# Patient Record
Sex: FEMALE | Race: WHITE | NOT HISPANIC OR LATINO | Employment: FULL TIME | URBAN - METROPOLITAN AREA
[De-identification: names, ages, dates, MRNs, and addresses within clinical notes are randomized per-mention and may not be internally consistent; named-entity substitution may affect disease eponyms.]

---

## 2023-07-07 ENCOUNTER — HOSPITAL ENCOUNTER (EMERGENCY)
Facility: CLINIC | Age: 28
Discharge: HOME | End: 2023-07-08
Attending: EMERGENCY MEDICINE | Admitting: EMERGENCY MEDICINE
Payer: COMMERCIAL

## 2023-07-07 VITALS
RESPIRATION RATE: 18 BRPM | BODY MASS INDEX: 23.28 KG/M2 | HEART RATE: 66 BPM | TEMPERATURE: 97.6 F | HEIGHT: 67 IN | DIASTOLIC BLOOD PRESSURE: 72 MMHG | SYSTOLIC BLOOD PRESSURE: 121 MMHG | WEIGHT: 148.3 LBS | OXYGEN SATURATION: 99 %

## 2023-07-07 DIAGNOSIS — S01.511A LACERATION OF INTRAORAL SURFACE OF LIP, INITIAL ENCOUNTER: ICD-10-CM

## 2023-07-07 PROCEDURE — 99282 EMERGENCY DEPT VISIT SF MDM: CPT

## 2023-07-08 NOTE — ED TRIAGE NOTES
Patient was at a [employer] Game and got hit with a baseball on left side of lower lip. There is a laceration on the inside of the left lower lip, mild bleeding. Denies injuries elsewhere, denies dental/bite issues caused by baseball injury. Mild swelling and bruising noted on left side of lower lip.      Triage Assessment       Row Name 07/07/23 4380       Triage Assessment (Adult)    Airway WDL WDL       Respiratory WDL    Respiratory WDL WDL       Skin Circulation/Temperature WDL    Skin Circulation/Temperature WDL WDL       Cardiac WDL    Cardiac WDL WDL       Peripheral/Neurovascular WDL    Peripheral Neurovascular WDL WDL       Cognitive/Neuro/Behavioral WDL    Cognitive/Neuro/Behavioral WDL WDL

## 2023-07-08 NOTE — ED PROVIDER NOTES
EMERGENCY DEPARTMENT ENCOUNTER      NAME: Jazlyn Wood  AGE: 27 year old female  YOB: 1995  MRN: [id number]  EVALUATION DATE & TIME: 7/7/2023 11:52 PM    PCP: Clinic, [facility]    ED PROVIDER: Evelina Olivares M.D.      Chief Complaint   Patient presents with     Lip Laceration         FINAL IMPRESSION:  1. Laceration of intraoral surface of lip, initial encounter        MEDICAL DECISION MAKING:    Pertinent Labs & Imaging studies reviewed. (See chart for details)  ED Course as of 07/08/23 0313   Sat Jul 08, 2023   0009 Afebrile.  Vital signs are unremarkable.  Patient presenting with laceration to her right lower lip.  She was at a [employer] game, got hit in the mouth with a foul ball.  Has a small laceration to the inside of her lip.  Bleeding is controlled.  No loose teeth, no tenderness with her jaw.  Able to open and close her jaw without issue.  No other facial pain.  No blood thinner medications.  No loss of consciousness.  No neck pain.    Exam for patient here with swollen bottom lip primarily on the right.  She has a small 1 cm laceration shallow to the inside of her lower lip on the right-hand side.  No through and through laceration.  No injury to the vermilion border.  No loose teeth.  No tenderness on palpation of the jaw.  No midline C-spine tenderness.    No need for any primary closure.  Patient was given instruction to use salt water rinses few times a day as well as to rinse her mouth out with water after she eats or drinks anything for the next week.  Will discharge home at this time.             Medical Decision Making    History:    Supplemental history from: Documented in chart, if applicable    External Record(s) reviewed: Documented in chart, if applicable.    Work Up:    Chart documentation includes differential considered and any EKGs or imaging independently interpreted by provider, where specified.    In additional to work up documented, I considered the  following work up: Documented in chart, if applicable.    External consultation:    Discussion of management with another provider: Documented in chart, if applicable    Complicating factors:    Care impacted by chronic illness: N/A    Care affected by social determinants of health: N/A    Disposition considerations: Discharge. No recommendations on prescription strength medication(s). See documentation for any additional details.          Critical care: 0 minutes excluding separately billable procedures.  Includes bedside management, time reviewing test results, review of records, discussing the case with staff, documenting the medical record and time spent with family members (or surrogate decision makers) discussing specific treatment issues.          ED COURSE:  12:10 AM Met with patient for initial interview and exam. Discussed initial plan for care for their stay in the emergency department. The importance of close follow up was discussed. We reviewed warning signs and symptoms, and I instructed Ms. Wood to return to the emergency department immediately if she develops any new or worsening symptoms. I provided additional verbal discharge instructions. Ms. Wood expressed understanding and agreement with this plan of care, her questions were answered, and she was discharged in stable condition.     MEDICATIONS GIVEN IN THE EMERGENCY:  Medications - No data to display    NEW PRESCRIPTIONS STARTED AT TODAY'S ER VISIT:  There are no discharge medications for this patient.         =================================================================    HPI    Patient information was obtained from: patient    Use of : N/A        Jazlyn Wood is a 27 year old female who presents to the ED for an evaluation of a lip laceraction    Earlier today the patient was at a [employer] game when she got hit in the mouth with a foul ball. The patient endorsed a small laceration to the the inside of her lip after the  "incident. The bleeding is currently controlled. She denies any loss of consciousness after the incident.     She denies any current blood thinning medications. The patient denies any loose teeth, tenderness with her jaw, facial pain, neck pain, or any other complaints.      REVIEW OF SYSTEMS   Review of Systems   Skin:        Positive for a laceration to the inside of her lip.       PAST MEDICAL HISTORY:  No past medical history on file.    PAST SURGICAL HISTORY:  No past surgical history on file.    CURRENT MEDICATIONS:    No current facility-administered medications for this encounter.  No current outpatient medications on file.    ALLERGIES:  No Known Allergies    FAMILY HISTORY:  No family history on file.    SOCIAL HISTORY:   Social History     Socioeconomic History     Marital status:        PHYSICAL EXAM:    Vitals: /72   Pulse 66   Temp 97.6  F (36.4  C) (Temporal)   Resp 18   Ht 1.702 m (5' 7\")   Wt 67.3 kg (148 lb 4.8 oz)   SpO2 99%   BMI 23.23 kg/m     General:. Alert and interactive, comfortable appearing.  HENT: Oropharynx without erythema or exudates. MMM.  TMs clear bilaterally. Swollen bottom lip primarily on the right. She has a small 1 cm laceration shallow to the inside of her lower lip on the right-hand side. No through and through laceration.  No injury to the vermilion border.  No loose teeth.  No tenderness on palpation of the jaw.   Eyes: Pupils mid-sized and equally reactive.   Neck: Full AROM.  No midline tenderness to palpation.  Cardiovascular: Regular rate and rhythm. Peripheral pulses 2+ bilaterally.  Chest/Pulmonary: Normal work of breathing.  Skin: Warm and dry. Normal skin color.   Neuro: Speech clear.   Psych: Normal affect/mood, cooperative, memory appropriate.     LAB:  All pertinent labs reviewed and interpreted.  Labs Ordered and Resulted from Time of ED Arrival to Time of ED Departure - No data to display    RADIOLOGY:  No orders to display       PROCEDURES: "   None      I, Olga Wheeler, am serving as a scribe to document services personally performed by Dr. Evelina Olivares  based on my observation and the provider's statements to me. I, Evelina Olivares MD attest that Olga Wheeler is acting in a scribe capacity, has observed my performance of the services and has documented them in accordance with my direction.      Evelina Olivares M.D.  Emergency Medicine  [facility] EMERGENCY ROOM  [address] [address]  [phone]  Dept: [phone]       Evelina Olivares MD  07/08/23 0313

## 2024-11-11 ENCOUNTER — OFFICE VISIT (OUTPATIENT)
Dept: URGENT CARE | Facility: URGENT CARE | Age: 29
End: 2024-11-11
Payer: COMMERCIAL

## 2024-11-11 VITALS
WEIGHT: 149.3 LBS | BODY MASS INDEX: 23.38 KG/M2 | OXYGEN SATURATION: 100 % | HEART RATE: 57 BPM | SYSTOLIC BLOOD PRESSURE: 117 MMHG | DIASTOLIC BLOOD PRESSURE: 76 MMHG | TEMPERATURE: 98.4 F

## 2024-11-11 DIAGNOSIS — R21 RASH AND NONSPECIFIC SKIN ERUPTION: Primary | ICD-10-CM

## 2024-11-11 LAB — WBC # BLD AUTO: 7.3 10E3/UL (ref 4–11)

## 2024-11-11 PROCEDURE — 36415 COLL VENOUS BLD VENIPUNCTURE: CPT | Performed by: PHYSICIAN ASSISTANT

## 2024-11-11 PROCEDURE — 86618 LYME DISEASE ANTIBODY: CPT | Performed by: PHYSICIAN ASSISTANT

## 2024-11-11 PROCEDURE — 85048 AUTOMATED LEUKOCYTE COUNT: CPT | Performed by: PHYSICIAN ASSISTANT

## 2024-11-11 PROCEDURE — 99204 OFFICE O/P NEW MOD 45 MIN: CPT | Performed by: PHYSICIAN ASSISTANT

## 2024-11-11 RX ORDER — VALACYCLOVIR HYDROCHLORIDE 1 G/1
1000 TABLET, FILM COATED ORAL 3 TIMES DAILY
Qty: 21 TABLET | Refills: 0 | Status: SHIPPED | OUTPATIENT
Start: 2024-11-11 | End: 2024-11-18

## 2024-11-11 NOTE — PROGRESS NOTES
SUBJECTIVE:  Jazlyn Wood is a 29 year old female who presents to the clinic today for a rash.    Was treated for Lyme exposure 1 month ago. E-visit 10/11 prophylactic doxy.  Tick removed less  than 32 hours. Rash has worsened at site, and now extends to breast  Has been given topical steroid two weeks ago, which has taken away itch, but rash continues to extend toward midline    No past medical history on file.  Current Outpatient Medications   Medication Sig Dispense Refill    valACYclovir (VALTREX) 1000 mg tablet Take 1 tablet (1,000 mg) by mouth 3 times daily for 7 days. 21 tablet 0     Social History     Tobacco Use    Smoking status: Not on file    Smokeless tobacco: Not on file   Substance Use Topics    Alcohol use: Not on file       ROS:  Review of systems negative except as stated above.    EXAM:   /76   Pulse 57   Temp 98.4  F (36.9  C) (Tympanic)   Wt 67.7 kg (149 lb 4.8 oz)   LMP 11/03/2024 (Exact Date)   SpO2 100%   BMI 23.38 kg/m    GENERAL: alert, no acute distress.  SKIN:   Patch of confluent vesicle at sitre of bite  Mild erythema without induration superior to nipple. Line of induration under breast  GENERAL APPEARANCE: healthy, alert and no distress  NEURO: Normal strength and tone, sensory exam grossly normal,  normal speech and mentation    ASSESSMENT:  (R21) Rash and nonspecific skin eruption  (primary encounter diagnosis)  Comment: Presentation most consistent with shingles, but given atypical presentation [facility]  Plan: WBC count, LYME DISEASE TOTAL ANTIBODIES WITH         REFLEX TO CONFIRMATION, valACYclovir (VALTREX)         1000 mg tablet, Primary Care Referral, Adult         Dermatology  Referral

## 2024-11-12 ENCOUNTER — OFFICE VISIT (OUTPATIENT)
Dept: PEDIATRICS | Facility: CLINIC | Age: 29
End: 2024-11-12
Payer: COMMERCIAL

## 2024-11-12 VITALS
TEMPERATURE: 97.5 F | DIASTOLIC BLOOD PRESSURE: 62 MMHG | BODY MASS INDEX: 23.49 KG/M2 | HEIGHT: 67 IN | RESPIRATION RATE: 16 BRPM | OXYGEN SATURATION: 99 % | SYSTOLIC BLOOD PRESSURE: 94 MMHG | HEART RATE: 55 BPM | WEIGHT: 149.7 LBS

## 2024-11-12 DIAGNOSIS — R21 RASH AND NONSPECIFIC SKIN ERUPTION: Primary | ICD-10-CM

## 2024-11-12 LAB — B BURGDOR IGG+IGM SER QL: 0.42

## 2024-11-12 PROCEDURE — 99213 OFFICE O/P EST LOW 20 MIN: CPT | Performed by: STUDENT IN AN ORGANIZED HEALTH CARE EDUCATION/TRAINING PROGRAM

## 2024-11-12 PROCEDURE — G2211 COMPLEX E/M VISIT ADD ON: HCPCS | Performed by: STUDENT IN AN ORGANIZED HEALTH CARE EDUCATION/TRAINING PROGRAM

## 2024-11-12 ASSESSMENT — PAIN SCALES - GENERAL: PAINLEVEL_OUTOF10: NO PAIN (0)

## 2024-11-12 NOTE — PROGRESS NOTES
Assessment & Plan     Rash and nonspecific skin eruption  Clinically given distribution and characteristics does seem most consistent with shingles and recommend take valtrex as prescribed by urgent care. Does not seem consistent with cellulitis and do not think cephalexin (Keflex) indicated. No systemic signs or symptoms. Does not seem consistent with Lyme disease. If worsening or not improving In shared decision making with patient will obtain left breast ultrasound. Discussed plan of care and reasons to return to clinic or present to the ED (emergency department). Patient and/or guardian in agreement with plan.  - US Breast Left Limited 1-3 Quadrants; Future      The longitudinal plan of care for the diagnosis(es)/condition(s) as documented were addressed during this visit. Due to the added complexity in care, I will continue to support Jazlyn in the subsequent management and with ongoing continuity of care.          Reynaldo Hobson is a 29 year old, presenting for the following health issues:  Establish Care and Breast Problem (swelling)      11/12/2024     9:15 AM   Additional Questions   Roomed by Jackie   Accompanied by none         11/12/2024     9:15 AM   Patient Reported Additional Medications   Patient reports taking the following new medications none     History of Present Illness       Reason for visit:  Follow up on breast swelling & affects from deer tick bite    She eats 2-3 servings of fruits and vegetables daily.She consumes 0 sweetened beverage(s) daily.She exercises with enough effort to increase her heart rate 30 to 60 minutes per day.  She exercises with enough effort to increase her heart rate 4 days per week.   She is taking medications regularly.     Saw dermatology a few weeks ago   -originally for eyelashes (serum helping)   -prescribed steroid cream and a week after started getting bumps and rash  Urgent care prescribed valtrex  Had COVID19 then flu then a viral upper respiratory  "illness in past month    No focal arthralgias, joint swelling              Objective    BP 94/62 (BP Location: Right arm, Patient Position: Sitting, Cuff Size: Adult Regular)   Pulse 55   Temp 97.5  F (36.4  C) (Temporal)   Resp 16   Ht 1.702 m (5' 7\")   Wt 67.9 kg (149 lb 11.2 oz)   LMP 11/03/2024 (Exact Date)   SpO2 99%   BMI 23.45 kg/m    Body mass index is 23.45 kg/m .  Physical Exam   GENERAL: alert and no distress  BREAST: normal without masses, tenderness or nipple discharge and no palpable axillary masses or adenopathy  SKIN: pink plaques (3 on left breast and one on left mid back just inferior to lateral scapula edge)            Signed Electronically by: Deirdre E. Milligan, MD    "

## 2024-12-15 ENCOUNTER — HEALTH MAINTENANCE LETTER (OUTPATIENT)
Age: 29
End: 2024-12-15